# Patient Record
Sex: FEMALE | Race: WHITE | NOT HISPANIC OR LATINO | URBAN - METROPOLITAN AREA
[De-identification: names, ages, dates, MRNs, and addresses within clinical notes are randomized per-mention and may not be internally consistent; named-entity substitution may affect disease eponyms.]

---

## 2017-06-01 ENCOUNTER — EMERGENCY (EMERGENCY)
Facility: HOSPITAL | Age: 77
LOS: 1 days | Discharge: HOME | End: 2017-06-01
Attending: EMERGENCY MEDICINE
Payer: MEDICARE

## 2017-06-01 VITALS
WEIGHT: 209 LBS | DIASTOLIC BLOOD PRESSURE: 69 MMHG | RESPIRATION RATE: 20 BRPM | HEIGHT: 69 IN | SYSTOLIC BLOOD PRESSURE: 97 MMHG | HEART RATE: 88 BPM

## 2017-06-01 DIAGNOSIS — Z84.89 FAMILY HISTORY OF OTHER SPECIFIED CONDITIONS: Chronic | ICD-10-CM

## 2017-06-01 LAB
ALBUMIN SERPL ELPH-MCNC: 3.8 G/DL — SIGNIFICANT CHANGE UP (ref 3.3–5.2)
ALP SERPL-CCNC: 69 U/L — SIGNIFICANT CHANGE UP (ref 40–120)
ALT FLD-CCNC: 24 U/L — SIGNIFICANT CHANGE UP
ANION GAP SERPL CALC-SCNC: 16 MMOL/L — SIGNIFICANT CHANGE UP (ref 5–17)
AST SERPL-CCNC: 17 U/L — SIGNIFICANT CHANGE UP
BILIRUB SERPL-MCNC: 0.6 MG/DL — SIGNIFICANT CHANGE UP (ref 0.4–2)
BUN SERPL-MCNC: 16 MG/DL — SIGNIFICANT CHANGE UP (ref 8–20)
CALCIUM SERPL-MCNC: 9.5 MG/DL — SIGNIFICANT CHANGE UP (ref 8.6–10.2)
CHLORIDE SERPL-SCNC: 96 MMOL/L — LOW (ref 98–107)
CK SERPL-CCNC: 32 U/L — SIGNIFICANT CHANGE UP (ref 25–170)
CO2 SERPL-SCNC: 26 MMOL/L — SIGNIFICANT CHANGE UP (ref 22–29)
CREAT SERPL-MCNC: 0.76 MG/DL — SIGNIFICANT CHANGE UP (ref 0.5–1.3)
GLUCOSE SERPL-MCNC: 145 MG/DL — HIGH (ref 70–115)
HCT VFR BLD CALC: 42 % — SIGNIFICANT CHANGE UP (ref 37–47)
HGB BLD-MCNC: 14.6 G/DL — SIGNIFICANT CHANGE UP (ref 12–16)
MCHC RBC-ENTMCNC: 29.6 PG — SIGNIFICANT CHANGE UP (ref 27–31)
MCHC RBC-ENTMCNC: 34.8 G/DL — SIGNIFICANT CHANGE UP (ref 32–36)
MCV RBC AUTO: 85 FL — SIGNIFICANT CHANGE UP (ref 81–99)
PLATELET # BLD AUTO: 256 K/UL — SIGNIFICANT CHANGE UP (ref 150–400)
POTASSIUM SERPL-MCNC: 4.1 MMOL/L — SIGNIFICANT CHANGE UP (ref 3.5–5.3)
POTASSIUM SERPL-SCNC: 4.1 MMOL/L — SIGNIFICANT CHANGE UP (ref 3.5–5.3)
PROT SERPL-MCNC: 7.3 G/DL — SIGNIFICANT CHANGE UP (ref 6.6–8.7)
RBC # BLD: 4.94 M/UL — SIGNIFICANT CHANGE UP (ref 4.4–5.2)
RBC # FLD: 13.4 % — SIGNIFICANT CHANGE UP (ref 11–15.6)
SODIUM SERPL-SCNC: 138 MMOL/L — SIGNIFICANT CHANGE UP (ref 135–145)
TROPONIN T SERPL-MCNC: <0.01 NG/ML — SIGNIFICANT CHANGE UP (ref 0–0.06)
WBC # BLD: 9 K/UL — SIGNIFICANT CHANGE UP (ref 4.8–10.8)
WBC # FLD AUTO: 9 K/UL — SIGNIFICANT CHANGE UP (ref 4.8–10.8)

## 2017-06-01 PROCEDURE — 71010: CPT | Mod: 26

## 2017-06-01 PROCEDURE — 93010 ELECTROCARDIOGRAM REPORT: CPT

## 2017-06-01 PROCEDURE — 99285 EMERGENCY DEPT VISIT HI MDM: CPT

## 2017-06-01 RX ORDER — LEVOTHYROXINE SODIUM 125 MCG
1 TABLET ORAL
Qty: 0 | Refills: 0 | COMMUNITY

## 2017-06-01 RX ORDER — ASPIRIN/CALCIUM CARB/MAGNESIUM 324 MG
1 TABLET ORAL
Qty: 0 | Refills: 0 | COMMUNITY

## 2017-06-01 RX ORDER — SODIUM CHLORIDE 9 MG/ML
1000 INJECTION INTRAMUSCULAR; INTRAVENOUS; SUBCUTANEOUS ONCE
Qty: 0 | Refills: 0 | Status: COMPLETED | OUTPATIENT
Start: 2017-06-01 | End: 2017-06-01

## 2017-06-01 RX ORDER — LABETALOL HCL 100 MG
1 TABLET ORAL
Qty: 0 | Refills: 0 | COMMUNITY

## 2017-06-01 RX ORDER — BENAZEPRIL HYDROCHLORIDE 40 MG/1
1 TABLET ORAL
Qty: 0 | Refills: 0 | COMMUNITY

## 2017-06-01 RX ORDER — GABAPENTIN 400 MG/1
0 CAPSULE ORAL
Qty: 0 | Refills: 0 | COMMUNITY

## 2017-06-01 RX ORDER — SIMVASTATIN 20 MG/1
1 TABLET, FILM COATED ORAL
Qty: 0 | Refills: 0 | COMMUNITY

## 2017-06-01 RX ORDER — VERAPAMIL HCL 240 MG
1 CAPSULE, EXTENDED RELEASE PELLETS 24 HR ORAL
Qty: 0 | Refills: 0 | COMMUNITY

## 2017-06-01 RX ORDER — ACETAMINOPHEN 500 MG
0 TABLET ORAL
Qty: 0 | Refills: 0 | COMMUNITY

## 2017-06-01 RX ADMIN — SODIUM CHLORIDE 500 MILLILITER(S): 9 INJECTION INTRAMUSCULAR; INTRAVENOUS; SUBCUTANEOUS at 20:05

## 2017-06-01 NOTE — ED PROVIDER NOTE - NS ED MD SCRIBE ATTENDING SCRIBE SECTIONS
DISPOSITION/HISTORY OF PRESENT ILLNESS/REVIEW OF SYSTEMS/PAST MEDICAL/SURGICAL/SOCIAL HISTORY/VITAL SIGNS( Pullset)/PHYSICAL EXAM/RESULTS

## 2017-06-01 NOTE — ED PROVIDER NOTE - CARDIAC, MLM
Normal rate, regular rhythm.  Heart sounds S1, S2.  No murmurs, rubs or gallops. Radial, femoral, dorsalis pedis and posterior tibial pulses intact.

## 2017-06-01 NOTE — ED ADULT NURSE NOTE - OBJECTIVE STATEMENT
Pt received in [facility]L c/o abd pain and back pain. Pt with shingles on back that radiates around left side to abd. Pt was dx May 12th and the shingles appear to be scabbed over. Pt states she went to [place] and was admitted and given pain medication she cannot recall the name. Pt now states she had 1 BM in the last 5 days and feels constipated. Abd is soft, non distended and tender to palpation in all 4 quadrants. +bsx4. Pt also states she fainted on [place] [facility] Pt was helped to the floor by her  and son and denies hitting her head or anything. Pt deneis chest pain, SOB, fever/chills, N/V/D.

## 2017-06-01 NOTE — ED PROVIDER NOTE - PROGRESS NOTE DETAILS
pt with mild orthostatic changes will hydrate, pt has bp discrepancy in both arms concern for possible aortic involvement will consider ct scan of chest, cxr nml mediastinum

## 2017-06-01 NOTE — ED ADULT TRIAGE NOTE - CHIEF COMPLAINT QUOTE
pt states she was moving a matrass 3 weeks ago and thought she pulled a muscle in her back. pt was admitted to [facility] and given pain meds which caused her to hallucinate and become constipated. pt was subsequently diagnosed with shingles and taken off meds. . pt reportedly had diarrhea x 1 week.pt and fainted last thursday. pt has become increasingly weak and fainted again last Saturday 5/26/17. pt is hypotensive at triage

## 2017-06-01 NOTE — ED ADULT NURSE NOTE - CHIEF COMPLAINT QUOTE
pt states she was moving a matrass 3 weeks ago and thought she pulled a muscle in her back. pt was admitted to [facility] and given pain meds which caused her to hallucinate and become constipated. pt was subsequently diagnosed with shingles and taken off meds. . pt reportedly had diarrhea x 1 week.pt and fainted last thursday. pt has become increasingly weak and fainted again last Saturday 5/26/17. pt is hypotensive at triage

## 2017-06-01 NOTE — ED PROVIDER NOTE - OBJECTIVE STATEMENT
76 y/o F with hx of DM, HTN presents to the ED with multiple medical complaints today. As per sister at bedside, pt was moving her matress 3 weeks ago and suddenly felt back pain 5-6 hours later. Pt was evaluated for her back pain by her PMD who prescribed her pain medications, taken with no relief. Pt was then evaluated at [place] [facility] and prescribed Naproxen, Oxycodone and Diazepam over two visits, all taken with no relief in her back pain. Pt returned to [place] ED one more time and was found to have shingles to her back; pt [place] [facility] [place].     During her hospitalization, her shingles was treated with anti-viral medication but she states she no longer uses it after her discharge. She notes that she had been constipated from consuming her pain medications. Pt was given laxatives and stool softeners to treat her constipation. She does report receiving an enema for her constipation but states that it did not help her. Pt also notes that during her 1 week hospitalization, she began to experience hallucinations as a result from her pain medications as well.    After pt was discharged from [place], she states that she felt increased weakness and experienced several episodes of diarrhea; she still notes persisting shingles to her back and that it has began to spread to her front around her L side. Pt had multiple episodes of "passing out", prompting her to return to [place] ED where she had her BP stabilized and hydrated; pt was admitted for outpt observation 5/29/16. She notes her sx began to recede slightly but today, pt reports a significant decrease in her BP: 160/90 L arm and 144/72 R arm this morning, 75/54 L arm and 159/80 R arm after taking her HTN medications. Pt's primary concern for her ED visit today is her varying BP throughout the day as well as inconsistent BP between her L and R arms. Pt denies chest pain, HA, fever, chills. No further complaints at this time. PMD is Dr. Nathan. Pt has appointment with Cardiologist Dr. Botello at [facility] tomorrow. History primarily received from sister at bedside.

## 2017-06-02 VITALS
TEMPERATURE: 98 F | RESPIRATION RATE: 18 BRPM | SYSTOLIC BLOOD PRESSURE: 159 MMHG | OXYGEN SATURATION: 100 % | HEART RATE: 80 BPM | DIASTOLIC BLOOD PRESSURE: 82 MMHG

## 2017-06-02 DIAGNOSIS — I77.9 DISORDER OF ARTERIES AND ARTERIOLES, UNSPECIFIED: ICD-10-CM

## 2017-06-02 DIAGNOSIS — I10 ESSENTIAL (PRIMARY) HYPERTENSION: ICD-10-CM

## 2017-06-02 DIAGNOSIS — I77.1 STRICTURE OF ARTERY: ICD-10-CM

## 2017-06-02 PROCEDURE — 82550 ASSAY OF CK (CPK): CPT

## 2017-06-02 PROCEDURE — 85027 COMPLETE CBC AUTOMATED: CPT

## 2017-06-02 PROCEDURE — 71275 CT ANGIOGRAPHY CHEST: CPT | Mod: 26

## 2017-06-02 PROCEDURE — 71045 X-RAY EXAM CHEST 1 VIEW: CPT

## 2017-06-02 PROCEDURE — 93005 ELECTROCARDIOGRAM TRACING: CPT

## 2017-06-02 PROCEDURE — 71275 CT ANGIOGRAPHY CHEST: CPT

## 2017-06-02 PROCEDURE — 80053 COMPREHEN METABOLIC PANEL: CPT

## 2017-06-02 PROCEDURE — 74174 CTA ABD&PLVS W/CONTRAST: CPT

## 2017-06-02 PROCEDURE — 99284 EMERGENCY DEPT VISIT MOD MDM: CPT | Mod: 25

## 2017-06-02 PROCEDURE — 74174 CTA ABD&PLVS W/CONTRAST: CPT | Mod: 26

## 2017-06-02 PROCEDURE — 84484 ASSAY OF TROPONIN QUANT: CPT

## 2017-06-02 NOTE — CONSULT NOTE ADULT - ASSESSMENT
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

## 2017-06-02 NOTE — CONSULT NOTE ADULT - PROBLEM SELECTOR RECOMMENDATION 3
f/u with Dr. Murcia as outpatient   Recommend arterial and venous duplex of b/l lower extremities f/u with Dr. Murcia as outpatient   Recommend arterial and venous duplex of b/l lower extremities  explained that this is not emergent procedure and that pt should follow up with Vascular surgeon, even though she does not want surgery

## 2017-06-02 NOTE — CONSULT NOTE ADULT - PROBLEM SELECTOR RECOMMENDATION 9
No acute vascular surgery intervention at this time  Control of Blood pressure  f/u cardiologist, she has scheduled appointment today

## 2017-06-02 NOTE — CONSULT NOTE ADULT - SUBJECTIVE AND OBJECTIVE BOX
HPI: 76 y/o F w/ PMH HTN, DM comes to the ED w/ c/o back pain and findings from [facility] of difference in BP between  her right and left upper extremity. She is recovering from shingles in which she was hospitalized for a week in [place]. She was given pain medications which caused some constipation and was given laxatives for it. Now she c/o multiple episodes of diarrhea and weakness. She denies fevers, chills, nausea, vomiting chest pain, palpitations, or dizziness.    Allergies: Diazepam, oxycodone (dizziness)  PMH: HTN, DM  PSH: Cholecystectomy 2009 in [place]  SH: Denies smoking history     Medications:  1. Labetalol 100 mg  Daily  2. Verapamil 240 mg daily  3. Benzapril 20 mg Daily  4. Simvastatin 5 mg at Bedtime  5. Janumet XR 1000mg-50mg 1 in AM and 1/2 at night  6. ASA 81 mg Daily     Physical exam:  Vital signs noted, Right arm(Sitting) : BP- 176/90; Left arm  (Sitting)-159/82; HR- 82, Afebrile  General: NAD  Chest: Normal S1 and S2 sounds  Abd: Soft, nondistended, nontender  Ext: Palpable pulses in upper extremities bilaterally, no edema    Labs:    CBC- no leukocytosis or neutrophil shift  BMP- Electrolytes wnl    Imaging:  CT Angio: No aortic dissection or aneurysm. Notable for marked narrowing/stenosis of origin of left subclavian artery. Occlusion of left proximal SFA w/ distal reconstitution. HPI: 76 y/o F w/ PMH HTN, DM comes to the ED w/ c/o back pain and findings from [facility] of difference in BP between  her right and left upper extremity. She is recovering from shingles in which she was hospitalized for a week in [place]. She was given pain medications which caused some constipation and was given laxatives for it. Now she c/o multiple episodes of diarrhea and weakness. She denies fevers, chills, nausea, vomiting chest pain, palpitations, or dizziness.    Allergies: Diazepam, oxycodone (dizziness)  PMH: HTN, DM  PSH: Cholecystectomy 2009 in [place]  SH: Denies smoking history     Medications:  1. Labetalol 100 mg  Daily  2. Verapamil 240 mg daily  3. Benzapril 20 mg Daily  4. Simvastatin 5 mg at Bedtime  5. Janumet XR 1000mg-50mg 1 in AM and 1/2 at night  6. ASA 81 mg Daily     Physical exam:  Vital signs noted, Right arm(Sitting) : BP- 176/90; Left arm  (Sitting)-159/82; HR- 82, Afebrile  General: NAD  Chest: Normal S1 and S2 sounds  Abd: Soft, nondistended, nontender  Ext: Palpable 2+ pulses in upper extremities bilaterally - strong radial pulses, no edema, RLE varicose veins, no tissue loss bilaterally, motor and sensory intact    Labs:    CBC- no leukocytosis or neutrophil shift  BMP- Electrolytes wnl    Imaging:  CT Angio: No aortic dissection or aneurysm. Notable for marked narrowing/stenosis of origin of left subclavian artery. Occlusion of left proximal SFA w/ distal reconstitution.

## 2018-03-19 PROBLEM — M19.90 UNSPECIFIED OSTEOARTHRITIS, UNSPECIFIED SITE: Chronic | Status: ACTIVE | Noted: 2017-06-01

## 2018-03-19 PROBLEM — E78.5 HYPERLIPIDEMIA, UNSPECIFIED: Chronic | Status: ACTIVE | Noted: 2017-06-01

## 2018-03-19 PROBLEM — I10 ESSENTIAL (PRIMARY) HYPERTENSION: Chronic | Status: ACTIVE | Noted: 2017-06-01

## 2018-03-19 PROBLEM — E11.9 TYPE 2 DIABETES MELLITUS WITHOUT COMPLICATIONS: Chronic | Status: ACTIVE | Noted: 2017-06-02

## 2018-03-22 PROBLEM — Z00.00 ENCOUNTER FOR PREVENTIVE HEALTH EXAMINATION: Status: ACTIVE | Noted: 2018-03-22

## 2018-03-29 ENCOUNTER — APPOINTMENT (OUTPATIENT)
Dept: OPHTHALMOLOGY | Facility: CLINIC | Age: 78
End: 2018-03-29
Payer: MEDICARE

## 2018-03-29 DIAGNOSIS — H40.033 ANATOMICAL NARROW ANGLE, BILATERAL: ICD-10-CM

## 2018-03-29 DIAGNOSIS — H25.13 AGE-RELATED NUCLEAR CATARACT, BILATERAL: ICD-10-CM

## 2018-03-29 PROCEDURE — 92132 CPTRZD OPH DX IMG ANT SGM: CPT

## 2018-03-29 PROCEDURE — 92002 INTRM OPH EXAM NEW PATIENT: CPT

## 2021-02-05 ENCOUNTER — TRANSCRIPTION ENCOUNTER (OUTPATIENT)
Age: 81
End: 2021-02-05

## 2021-12-02 NOTE — ED ADULT NURSE NOTE - PATIENT DISCHARGE SIGNATURE
Impression: Dry eye syndrome of bilateral lacrimal glands: H04.123. Plan: Dry eyes account for the patient's complaints. There is no evidence of permanent changes to the cornea. Explained condition does not have a cure and will need artificial tears for maintenance. 02-Jun-2017

## 2023-07-26 ENCOUNTER — OFFICE (OUTPATIENT)
Dept: URBAN - METROPOLITAN AREA CLINIC 105 | Facility: CLINIC | Age: 83
Setting detail: OPHTHALMOLOGY
End: 2023-07-26
Payer: MEDICARE

## 2023-07-26 DIAGNOSIS — H40.2232: ICD-10-CM

## 2023-07-26 DIAGNOSIS — H40.033: ICD-10-CM

## 2023-07-26 DIAGNOSIS — E11.9: ICD-10-CM

## 2023-07-26 DIAGNOSIS — H35.363: ICD-10-CM

## 2023-07-26 DIAGNOSIS — H25.13: ICD-10-CM

## 2023-07-26 DIAGNOSIS — H35.033: ICD-10-CM

## 2023-07-26 DIAGNOSIS — C34.00: ICD-10-CM

## 2023-07-26 DIAGNOSIS — H47.393: ICD-10-CM

## 2023-07-26 PROCEDURE — 99214 OFFICE O/P EST MOD 30 MIN: CPT | Performed by: STUDENT IN AN ORGANIZED HEALTH CARE EDUCATION/TRAINING PROGRAM

## 2023-07-26 PROCEDURE — 92250 FUNDUS PHOTOGRAPHY W/I&R: CPT | Performed by: STUDENT IN AN ORGANIZED HEALTH CARE EDUCATION/TRAINING PROGRAM

## 2023-07-26 ASSESSMENT — KERATOMETRY
OD_K2POWER_DIOPTERS: 42.25
OS_K1POWER_DIOPTERS: 41.25
OS_K2POWER_DIOPTERS: 42.00
OS_AXISANGLE_DEGREES: 018
OD_AXISANGLE_DEGREES: 167
OD_K1POWER_DIOPTERS: 41.00

## 2023-07-26 ASSESSMENT — CONFRONTATIONAL VISUAL FIELD TEST (CVF)
OS_FINDINGS: FULL
OD_FINDINGS: FULL

## 2023-07-26 ASSESSMENT — SPHEQUIV_DERIVED
OD_SPHEQUIV: 3.25
OS_SPHEQUIV: 3.75
OS_SPHEQUIV: 3.375
OD_SPHEQUIV: 4
OD_SPHEQUIV: 3.25
OS_SPHEQUIV: 3.375

## 2023-07-26 ASSESSMENT — REFRACTION_CURRENTRX
OS_AXIS: 094
OS_CYLINDER: -1.00
OD_AXIS: 084
OD_SPHERE: +3.75
OD_OVR_VA: 20/
OS_ADD: +0.00
OS_AXIS: 180
OD_CYLINDER: 0.00
OD_VPRISM_DIRECTION: SV
OD_VPRISM_DIRECTION: SV
OS_VPRISM_DIRECTION: SV
OD_AXIS: 071
OD_SPHERE: +5.75
OD_ADD: +0.00
OS_OVR_VA: 20/
OS_ADD: +0.00
OD_SPHERE: +4.25
OS_SPHERE: +4.00
OS_CYLINDER: 0.00
OS_SPHERE: +4.75
OS_CYLINDER: -0.50
OD_CYLINDER: -1.00
OD_ADD: +0.00
OS_OVR_VA: 20/
OS_OVR_VA: 20/
OD_OVR_VA: 20/
OS_AXIS: 090
OD_CYLINDER: -0.50
OD_AXIS: 180
OS_VPRISM_DIRECTION: SV
OD_OVR_VA: 20/
OS_SPHERE: +5.50

## 2023-07-26 ASSESSMENT — REFRACTION_MANIFEST
OD_SPHERE: +4.50
OD_VA1: 20/40-
OD_AXIS: 091
OD_CYLINDER: -3.00
OD_VA1: 20/NI
OS_AXIS: 030
OD_CYLINDER: -1.00
OS_AXIS: 081
OS_VA1: 20/NI
OS_VA1: 20/30
OS_SPHERE: +4.00
OS_CYLINDER: -0.50
OS_SPHERE: +4.00
OD_AXIS: 080
OD_SPHERE: +4.75
OS_CYLINDER: -1.25

## 2023-07-26 ASSESSMENT — PACHYMETRY
OD_CT_UM: 575
OD_CT_CORRECTION: -2
OS_CT_UM: 586
OS_CT_CORRECTION: -3

## 2023-07-26 ASSESSMENT — AXIALLENGTH_DERIVED
OD_AL: 23.0282
OS_AL: 22.9819
OD_AL: 23.0282
OS_AL: 22.9819
OD_AL: 22.7531
OS_AL: 22.8441

## 2023-07-26 ASSESSMENT — VISUAL ACUITY
OS_BCVA: 20/40-2
OD_BCVA: 20/40

## 2023-07-26 ASSESSMENT — REFRACTION_AUTOREFRACTION
OS_CYLINDER: -1.25
OS_SPHERE: +4.00
OD_SPHERE: +4.75
OS_AXIS: 081
OD_AXIS: 091
OD_CYLINDER: -3.00

## 2023-07-26 ASSESSMENT — TONOMETRY
OD_IOP_MMHG: 16
OS_IOP_MMHG: 16

## 2023-08-22 ENCOUNTER — OFFICE (OUTPATIENT)
Dept: URBAN - METROPOLITAN AREA CLINIC 113 | Facility: CLINIC | Age: 83
Setting detail: OPHTHALMOLOGY
End: 2023-08-22
Payer: MEDICARE

## 2023-08-22 DIAGNOSIS — H25.11: ICD-10-CM

## 2023-08-22 DIAGNOSIS — H25.13: ICD-10-CM

## 2023-08-22 PROCEDURE — 92136 OPHTHALMIC BIOMETRY: CPT | Performed by: STUDENT IN AN ORGANIZED HEALTH CARE EDUCATION/TRAINING PROGRAM

## 2023-08-22 PROCEDURE — 99213 OFFICE O/P EST LOW 20 MIN: CPT | Performed by: STUDENT IN AN ORGANIZED HEALTH CARE EDUCATION/TRAINING PROGRAM

## 2023-08-22 ASSESSMENT — SPHEQUIV_DERIVED
OS_SPHEQUIV: 3.375
OS_SPHEQUIV: 3.75
OD_SPHEQUIV: 4
OD_SPHEQUIV: 3.25
OS_SPHEQUIV: 3.5
OD_SPHEQUIV: 24.75

## 2023-08-22 ASSESSMENT — REFRACTION_MANIFEST
OD_CYLINDER: -1.00
OD_VA1: 20/40-
OS_CYLINDER: -1.25
OD_AXIS: 091
OD_SPHERE: +4.75
OD_CYLINDER: -3.00
OS_SPHERE: +4.00
OS_CYLINDER: -0.50
OS_SPHERE: +4.00
OS_VA1: 20/NI
OD_AXIS: 080
OS_VA1: 20/30
OS_AXIS: 081
OS_AXIS: 030
OD_SPHERE: +4.50
OD_VA1: 20/NI

## 2023-08-22 ASSESSMENT — KERATOMETRY
OS_K1POWER_DIOPTERS: 41.75
OD_K1K2_AVERAGE: 41.875
OS_CYLPOWER_DEGREES: 0.5
OD_CYLPOWER_DEGREES: 0.25
OD_K2POWER_DIOPTERS: 42.00
OD_AXISANGLE_DEGREES: 143
OS_CYLAXISANGLE_DEGREES: 128
OD_AXISANGLE_DEGREES: 53
OD_AXISANGLE2_DEGREES: 143
OS_K2POWER_DIOPTERS: 42.25
OD_K2POWER_DIOPTERS: 42.00
OS_K2POWER_DIOPTERS: 42.25
OS_AXISANGLE_DEGREES: 128
OS_AXISANGLE2_DEGREES: 128
OS_K1POWER_DIOPTERS: 41.75
OD_K1POWER_DIOPTERS: 41.75
OD_K1POWER_DIOPTERS: 41.75
OD_CYLAXISANGLE_DEGREES: 143
OS_K1K2_AVERAGE: 42
OS_AXISANGLE_DEGREES: 38

## 2023-08-22 ASSESSMENT — VISUAL ACUITY
OD_BCVA: 20/40-2
OS_BCVA: 20/40-2

## 2023-08-22 ASSESSMENT — REFRACTION_CURRENTRX
OS_VPRISM_DIRECTION: SV
OS_CYLINDER: -0.50
OD_AXIS: 084
OD_CYLINDER: -0.50
OD_SPHERE: +4.25
OD_AXIS: 180
OD_CYLINDER: 0.00
OD_ADD: +0.00
OD_VPRISM_DIRECTION: SV
OS_AXIS: 094
OS_VPRISM_DIRECTION: SV
OD_OVR_VA: 20/
OS_CYLINDER: 0.00
OS_AXIS: 090
OS_ADD: +0.00
OS_ADD: +0.00
OS_SPHERE: +4.00
OD_SPHERE: +3.75
OD_SPHERE: +5.75
OS_OVR_VA: 20/
OS_SPHERE: +5.50
OS_SPHERE: +4.75
OS_AXIS: 180
OD_AXIS: 071
OD_ADD: +0.00
OD_OVR_VA: 20/
OD_OVR_VA: 20/
OS_OVR_VA: 20/
OD_CYLINDER: -1.00
OS_CYLINDER: -1.00
OD_VPRISM_DIRECTION: SV
OS_OVR_VA: 20/

## 2023-08-22 ASSESSMENT — REFRACTION_AUTOREFRACTION
OD_SPHERE: +24.75
OD_AXIS: 000
OS_AXIS: 069
OS_CYLINDER: -1.00
OD_CYLINDER: 0.00
OS_SPHERE: +4.00

## 2023-08-22 ASSESSMENT — PACHYMETRY
OD_CT_CORRECTION: -2
OS_CT_CORRECTION: -3
OS_CT_UM: 586
OD_CT_UM: 575

## 2023-08-22 ASSESSMENT — AXIALLENGTH_DERIVED
OD_AL: 22.668
OD_AL: 22.941
OS_AL: 22.7157
OS_AL: 22.8519
OS_AL: 22.8063
OD_AL: 17.05

## 2023-08-22 ASSESSMENT — CONFRONTATIONAL VISUAL FIELD TEST (CVF)
OS_FINDINGS: FULL
OD_FINDINGS: FULL

## 2023-09-19 ENCOUNTER — AMBULATORY SURGERY CENTER (OUTPATIENT)
Dept: URBAN - METROPOLITAN AREA SURGERY 4 | Facility: SURGERY | Age: 83
Setting detail: OPHTHALMOLOGY
End: 2023-09-19
Payer: MEDICARE

## 2023-09-19 DIAGNOSIS — H57.03: ICD-10-CM

## 2023-09-19 DIAGNOSIS — H25.11: ICD-10-CM

## 2023-09-19 PROCEDURE — 66982 XCAPSL CTRC RMVL CPLX WO ECP: CPT | Performed by: STUDENT IN AN ORGANIZED HEALTH CARE EDUCATION/TRAINING PROGRAM

## 2023-09-20 ENCOUNTER — OFFICE (OUTPATIENT)
Dept: URBAN - METROPOLITAN AREA CLINIC 113 | Facility: CLINIC | Age: 83
Setting detail: OPHTHALMOLOGY
End: 2023-09-20
Payer: MEDICARE

## 2023-09-20 DIAGNOSIS — Z96.1: ICD-10-CM

## 2023-09-20 DIAGNOSIS — H11.31: ICD-10-CM

## 2023-09-20 PROCEDURE — 99024 POSTOP FOLLOW-UP VISIT: CPT | Performed by: STUDENT IN AN ORGANIZED HEALTH CARE EDUCATION/TRAINING PROGRAM

## 2023-09-20 ASSESSMENT — REFRACTION_CURRENTRX
OD_OVR_VA: 20/
OS_AXIS: 180
OS_CYLINDER: -0.50
OD_VPRISM_DIRECTION: SV
OD_SPHERE: +4.25
OD_CYLINDER: -1.00
OD_CYLINDER: 0.00
OD_CYLINDER: -0.50
OS_SPHERE: +4.75
OS_VPRISM_DIRECTION: SV
OD_AXIS: 180
OD_OVR_VA: 20/
OS_AXIS: 094
OD_SPHERE: +5.75
OS_CYLINDER: 0.00
OD_ADD: +0.00
OD_AXIS: 071
OS_CYLINDER: -1.00
OS_ADD: +0.00
OD_SPHERE: +3.75
OS_VPRISM_DIRECTION: SV
OS_OVR_VA: 20/
OS_AXIS: 090
OD_AXIS: 084
OS_OVR_VA: 20/
OD_OVR_VA: 20/
OD_ADD: +0.00
OS_SPHERE: +5.50
OD_VPRISM_DIRECTION: SV
OS_SPHERE: +4.00
OS_ADD: +0.00
OS_OVR_VA: 20/

## 2023-09-20 ASSESSMENT — REFRACTION_MANIFEST
OS_VA1: 20/NI
OS_SPHERE: +4.00
OD_VA1: 20/40-
OD_SPHERE: +4.75
OD_CYLINDER: -3.00
OS_CYLINDER: -0.50
OD_AXIS: 080
OS_CYLINDER: -1.25
OS_SPHERE: +4.00
OD_AXIS: 091
OD_SPHERE: +4.50
OD_CYLINDER: -1.00
OS_AXIS: 081
OS_AXIS: 030
OD_VA1: 20/NI
OS_VA1: 20/30

## 2023-09-20 ASSESSMENT — CORNEAL EDEMA - FOLDS/STRIAE: OD_FOLDSSTRIAE: 3+

## 2023-09-20 ASSESSMENT — KERATOMETRY
OD_K2POWER_DIOPTERS: 41.75
OD_AXISANGLE_DEGREES: 155
OS_K2POWER_DIOPTERS: 42.00
OS_K1POWER_DIOPTERS: 42.00
OD_K1POWER_DIOPTERS: 41.25
OS_AXISANGLE_DEGREES: 090

## 2023-09-20 ASSESSMENT — AXIALLENGTH_DERIVED
OD_AL: 24.4543
OD_AL: 22.7959
OS_AL: 22.8519
OD_AL: 23.072
OS_AL: 22.7157

## 2023-09-20 ASSESSMENT — SPHEQUIV_DERIVED
OS_SPHEQUIV: 3.375
OS_SPHEQUIV: 3.75
OD_SPHEQUIV: 3.25
OD_SPHEQUIV: -0.25
OD_SPHEQUIV: 4

## 2023-09-20 ASSESSMENT — CORNEAL EDEMA CLINICAL DESCRIPTION: OD_CORNEALEDEMA: 2+

## 2023-09-20 ASSESSMENT — REFRACTION_AUTOREFRACTION
OS_CYLINDER: SPHERE
OD_CYLINDER: -1.50
OS_SPHERE: +24.75
OD_AXIS: 091
OD_SPHERE: +0.50
OS_AXIS: 069

## 2023-09-20 ASSESSMENT — CONFRONTATIONAL VISUAL FIELD TEST (CVF)
OS_FINDINGS: FULL
OD_FINDINGS: FULL

## 2023-09-20 ASSESSMENT — VISUAL ACUITY
OS_BCVA: 20/60-1
OD_BCVA: 20/40-2

## 2023-09-20 ASSESSMENT — PACHYMETRY
OS_CT_CORRECTION: -3
OD_CT_UM: 575
OS_CT_UM: 586
OD_CT_CORRECTION: -2

## 2023-09-20 ASSESSMENT — TONOMETRY: OD_IOP_MMHG: 14

## 2023-09-27 ENCOUNTER — RX ONLY (RX ONLY)
Age: 83
End: 2023-09-27

## 2023-09-27 ENCOUNTER — OFFICE (OUTPATIENT)
Dept: URBAN - METROPOLITAN AREA CLINIC 105 | Facility: CLINIC | Age: 83
Setting detail: OPHTHALMOLOGY
End: 2023-09-27
Payer: MEDICARE

## 2023-09-27 DIAGNOSIS — H25.12: ICD-10-CM

## 2023-09-27 DIAGNOSIS — Z96.1: ICD-10-CM

## 2023-09-27 PROBLEM — H11.31 SUB-CONJUNCTIVAL HEMORRHAGE; RIGHT EYE: Status: RESOLVED | Noted: 2023-09-20 | Resolved: 2023-09-27

## 2023-09-27 PROCEDURE — 99024 POSTOP FOLLOW-UP VISIT: CPT | Performed by: STUDENT IN AN ORGANIZED HEALTH CARE EDUCATION/TRAINING PROGRAM

## 2023-09-27 PROCEDURE — 92136 OPHTHALMIC BIOMETRY: CPT | Performed by: STUDENT IN AN ORGANIZED HEALTH CARE EDUCATION/TRAINING PROGRAM

## 2023-09-27 ASSESSMENT — PACHYMETRY
OS_CT_CORRECTION: -3
OD_CT_CORRECTION: -2
OD_CT_UM: 575
OS_CT_UM: 586

## 2023-09-27 ASSESSMENT — REFRACTION_MANIFEST
OD_CYLINDER: -3.00
OS_SPHERE: +4.00
OS_SPHERE: +4.00
OD_SPHERE: +4.50
OS_VA1: 20/NI
OD_SPHERE: +4.75
OS_AXIS: 030
OS_CYLINDER: -1.25
OD_VA1: 20/NI
OD_AXIS: 080
OS_VA1: 20/30
OS_AXIS: 081
OD_CYLINDER: -1.00
OS_CYLINDER: -0.50
OD_AXIS: 091
OD_VA1: 20/40-

## 2023-09-27 ASSESSMENT — REFRACTION_CURRENTRX
OS_CYLINDER: -0.50
OS_ADD: +0.00
OS_OVR_VA: 20/
OD_OVR_VA: 20/
OD_ADD: +0.00
OS_SPHERE: +5.50
OD_AXIS: 180
OD_CYLINDER: -1.00
OD_SPHERE: +3.75
OD_CYLINDER: 0.00
OD_AXIS: 084
OD_CYLINDER: -0.50
OS_ADD: +0.00
OS_VPRISM_DIRECTION: SV
OS_CYLINDER: -1.00
OD_SPHERE: +5.75
OD_VPRISM_DIRECTION: SV
OD_ADD: +0.00
OS_OVR_VA: 20/
OS_SPHERE: +4.00
OS_AXIS: 090
OD_SPHERE: +4.25
OS_OVR_VA: 20/
OS_AXIS: 094
OD_VPRISM_DIRECTION: SV
OS_CYLINDER: 0.00
OD_AXIS: 071
OS_AXIS: 180
OS_SPHERE: +4.75
OS_VPRISM_DIRECTION: SV

## 2023-09-27 ASSESSMENT — SPHEQUIV_DERIVED
OS_SPHEQUIV: 3.375
OD_SPHEQUIV: 4
OS_SPHEQUIV: 3.75
OS_SPHEQUIV: 3.375
OD_SPHEQUIV: 3.25
OD_SPHEQUIV: -0.375

## 2023-09-27 ASSESSMENT — AXIALLENGTH_DERIVED
OD_AL: 24.5564
OD_AL: 22.8388
OS_AL: 22.9819
OS_AL: 22.8441
OS_AL: 22.9819
OD_AL: 23.116

## 2023-09-27 ASSESSMENT — VISUAL ACUITY
OS_BCVA: 20/40-
OD_BCVA: 20/40-

## 2023-09-27 ASSESSMENT — KERATOMETRY
OS_K1POWER_DIOPTERS: 41.25
OD_K1POWER_DIOPTERS: 41.00
OS_K2POWER_DIOPTERS: 42.00
OS_AXISANGLE_DEGREES: 008
OD_AXISANGLE_DEGREES: 174
OD_K2POWER_DIOPTERS: 41.75

## 2023-09-27 ASSESSMENT — CONFRONTATIONAL VISUAL FIELD TEST (CVF)
OS_FINDINGS: FULL
OD_FINDINGS: FULL

## 2023-09-27 ASSESSMENT — TONOMETRY: OD_IOP_MMHG: 12

## 2023-09-27 ASSESSMENT — CORNEAL EDEMA CLINICAL DESCRIPTION: OD_CORNEALEDEMA: T 1+

## 2023-09-27 ASSESSMENT — REFRACTION_AUTOREFRACTION
OS_AXIS: 088
OD_SPHERE: +0.25
OS_SPHERE: +4.00
OD_AXIS: 091
OS_CYLINDER: -1.25
OD_CYLINDER: -1.25

## 2023-09-27 ASSESSMENT — CORNEAL EDEMA - FOLDS/STRIAE: OD_FOLDSSTRIAE: T 1+

## 2023-10-03 ENCOUNTER — AMBULATORY SURGERY CENTER (OUTPATIENT)
Dept: URBAN - METROPOLITAN AREA SURGERY 4 | Facility: SURGERY | Age: 83
Setting detail: OPHTHALMOLOGY
End: 2023-10-03
Payer: MEDICARE

## 2023-10-03 DIAGNOSIS — H25.12: ICD-10-CM

## 2023-10-03 PROCEDURE — 66984 XCAPSL CTRC RMVL W/O ECP: CPT | Mod: 79,LT | Performed by: STUDENT IN AN ORGANIZED HEALTH CARE EDUCATION/TRAINING PROGRAM

## 2023-10-04 ENCOUNTER — OFFICE (OUTPATIENT)
Dept: URBAN - METROPOLITAN AREA CLINIC 105 | Facility: CLINIC | Age: 83
Setting detail: OPHTHALMOLOGY
End: 2023-10-04
Payer: MEDICARE

## 2023-10-04 ENCOUNTER — RX ONLY (RX ONLY)
Age: 83
End: 2023-10-04

## 2023-10-04 DIAGNOSIS — Z96.1: ICD-10-CM

## 2023-10-04 PROBLEM — H25.12 CATARACT SENILE NUCLEAR SCLEROSIS;  , LEFT EYE: Status: RESOLVED | Noted: 2023-09-20 | Resolved: 2023-10-04

## 2023-10-04 PROCEDURE — 99024 POSTOP FOLLOW-UP VISIT: CPT | Performed by: STUDENT IN AN ORGANIZED HEALTH CARE EDUCATION/TRAINING PROGRAM

## 2023-10-04 ASSESSMENT — SPHEQUIV_DERIVED
OD_SPHEQUIV: 4
OS_SPHEQUIV: -0.375
OS_SPHEQUIV: 3.375
OS_SPHEQUIV: 3.75
OD_SPHEQUIV: 3.25
OD_SPHEQUIV: -0.25

## 2023-10-04 ASSESSMENT — REFRACTION_CURRENTRX
OD_CYLINDER: -0.50
OS_SPHERE: +5.50
OS_OVR_VA: 20/
OS_AXIS: 090
OD_ADD: +0.00
OD_SPHERE: +4.25
OD_VPRISM_DIRECTION: SV
OS_AXIS: 094
OS_SPHERE: +4.75
OS_OVR_VA: 20/
OS_ADD: +0.00
OS_CYLINDER: -0.50
OS_CYLINDER: -1.00
OS_VPRISM_DIRECTION: SV
OD_VPRISM_DIRECTION: SV
OD_AXIS: 071
OD_OVR_VA: 20/
OD_SPHERE: +3.75
OS_SPHERE: +4.00
OD_OVR_VA: 20/
OS_OVR_VA: 20/
OD_OVR_VA: 20/
OS_CYLINDER: 0.00
OD_CYLINDER: 0.00
OD_AXIS: 084
OS_ADD: +0.00
OD_ADD: +0.00
OD_SPHERE: +5.75
OS_VPRISM_DIRECTION: SV
OS_AXIS: 180
OD_AXIS: 180
OD_CYLINDER: -1.00

## 2023-10-04 ASSESSMENT — REFRACTION_MANIFEST
OS_CYLINDER: -1.25
OD_CYLINDER: -1.00
OS_AXIS: 030
OD_VA1: 20/NI
OD_CYLINDER: -3.00
OS_CYLINDER: -0.50
OS_AXIS: 081
OD_SPHERE: +4.75
OS_VA1: 20/NI
OS_SPHERE: +4.00
OD_SPHERE: +4.50
OD_AXIS: 080
OS_SPHERE: +4.00
OD_AXIS: 091
OS_VA1: 20/30
OD_VA1: 20/40-

## 2023-10-04 ASSESSMENT — CORNEAL EDEMA - FOLDS/STRIAE: OD_FOLDSSTRIAE: T 1+

## 2023-10-04 ASSESSMENT — PACHYMETRY
OD_CT_CORRECTION: -2
OS_CT_UM: 586
OS_CT_CORRECTION: -3
OD_CT_UM: 575

## 2023-10-04 ASSESSMENT — AXIALLENGTH_DERIVED
OS_AL: 22.7157
OS_AL: 24.3102
OD_AL: 22.7959
OS_AL: 22.8519
OD_AL: 23.072
OD_AL: 24.4543

## 2023-10-04 ASSESSMENT — KERATOMETRY
OD_K1POWER_DIOPTERS: 41.00
OS_K1POWER_DIOPTERS: 42.00
OD_K2POWER_DIOPTERS: 42.00
OS_AXISANGLE_DEGREES: 090
OD_AXISANGLE_DEGREES: 175
OS_K2POWER_DIOPTERS: 42.00

## 2023-10-04 ASSESSMENT — REFRACTION_AUTOREFRACTION
OD_AXIS: 086
OS_AXIS: 158
OD_CYLINDER: -1.00
OD_SPHERE: +0.25
OS_SPHERE: -0.25
OS_CYLINDER: -0.25

## 2023-10-04 ASSESSMENT — VISUAL ACUITY
OS_BCVA: 20/30+2
OD_BCVA: 20/30-

## 2023-10-04 ASSESSMENT — TONOMETRY: OS_IOP_MMHG: 10

## 2023-10-04 ASSESSMENT — CONFRONTATIONAL VISUAL FIELD TEST (CVF)
OD_FINDINGS: FULL
OS_FINDINGS: FULL

## 2023-10-04 ASSESSMENT — CORNEAL EDEMA CLINICAL DESCRIPTION: OD_CORNEALEDEMA: T 1+

## 2023-10-10 ENCOUNTER — OFFICE (OUTPATIENT)
Dept: URBAN - METROPOLITAN AREA CLINIC 113 | Facility: CLINIC | Age: 83
Setting detail: OPHTHALMOLOGY
End: 2023-10-10
Payer: MEDICARE

## 2023-10-10 DIAGNOSIS — Z96.1: ICD-10-CM

## 2023-10-10 PROCEDURE — 99024 POSTOP FOLLOW-UP VISIT: CPT | Performed by: STUDENT IN AN ORGANIZED HEALTH CARE EDUCATION/TRAINING PROGRAM

## 2023-10-10 ASSESSMENT — REFRACTION_CURRENTRX
OD_AXIS: 084
OS_SPHERE: +4.00
OS_AXIS: 180
OS_AXIS: 090
OS_ADD: +0.00
OS_AXIS: 094
OD_ADD: +0.00
OS_ADD: +0.00
OD_VPRISM_DIRECTION: SV
OD_AXIS: 180
OS_VPRISM_DIRECTION: SV
OD_AXIS: 071
OD_CYLINDER: -0.50
OS_CYLINDER: -1.00
OS_OVR_VA: 20/
OS_VPRISM_DIRECTION: SV
OS_CYLINDER: 0.00
OD_SPHERE: +3.75
OD_CYLINDER: 0.00
OS_SPHERE: +4.75
OD_VPRISM_DIRECTION: SV
OD_ADD: +0.00
OD_SPHERE: +5.75
OD_OVR_VA: 20/
OS_OVR_VA: 20/
OD_CYLINDER: -1.00
OD_OVR_VA: 20/
OD_OVR_VA: 20/
OS_SPHERE: +5.50
OS_OVR_VA: 20/
OS_CYLINDER: -0.50
OD_SPHERE: +4.25

## 2023-10-10 ASSESSMENT — SPHEQUIV_DERIVED
OD_SPHEQUIV: -0.375
OS_SPHEQUIV: 3.75
OD_SPHEQUIV: 3.25
OS_SPHEQUIV: 3.375
OD_SPHEQUIV: 4
OS_SPHEQUIV: -0.5

## 2023-10-10 ASSESSMENT — CORNEAL EDEMA - FOLDS/STRIAE: OD_FOLDSSTRIAE: ABSENT

## 2023-10-10 ASSESSMENT — PACHYMETRY
OD_CT_CORRECTION: -2
OS_CT_CORRECTION: -3
OD_CT_UM: 575
OS_CT_UM: 586

## 2023-10-10 ASSESSMENT — REFRACTION_MANIFEST
OS_CYLINDER: -1.25
OD_VA1: 20/NI
OS_AXIS: 081
OD_SPHERE: +4.50
OD_CYLINDER: -1.00
OS_CYLINDER: -0.50
OS_VA1: 20/30
OD_AXIS: 091
OD_VA1: 20/40-
OS_SPHERE: +4.00
OS_AXIS: 030
OS_VA1: 20/NI
OD_CYLINDER: -3.00
OD_SPHERE: +4.75
OS_SPHERE: +4.00
OD_AXIS: 080

## 2023-10-10 ASSESSMENT — REFRACTION_AUTOREFRACTION
OS_CYLINDER: -0.50
OS_SPHERE: -0.25
OD_SPHERE: +0.25
OD_CYLINDER: -1.25
OS_AXIS: 094
OD_AXIS: 094

## 2023-10-10 ASSESSMENT — AXIALLENGTH_DERIVED
OD_AL: 22.7959
OD_AL: 23.072
OD_AL: 24.5068
OS_AL: 22.8872
OS_AL: 23.0255
OS_AL: 24.5594

## 2023-10-10 ASSESSMENT — CONFRONTATIONAL VISUAL FIELD TEST (CVF)
OS_FINDINGS: FULL
OD_FINDINGS: FULL

## 2023-10-10 ASSESSMENT — KERATOMETRY
OD_K1POWER_DIOPTERS: 41.00
OD_K2POWER_DIOPTERS: 42.00
OS_AXISANGLE_DEGREES: 006
OS_K2POWER_DIOPTERS: 41.75
OD_AXISANGLE_DEGREES: 175
OS_K1POWER_DIOPTERS: 41.25

## 2023-10-10 ASSESSMENT — CORNEAL EDEMA CLINICAL DESCRIPTION: OD_CORNEALEDEMA: ABSENT

## 2023-10-10 ASSESSMENT — TONOMETRY
OS_IOP_MMHG: 12
OS_IOP_MMHG: 14

## 2023-10-10 ASSESSMENT — VISUAL ACUITY
OD_BCVA: 20/20-1
OS_BCVA: 20/30

## 2023-10-31 ENCOUNTER — OFFICE (OUTPATIENT)
Dept: URBAN - METROPOLITAN AREA CLINIC 113 | Facility: CLINIC | Age: 83
Setting detail: OPHTHALMOLOGY
End: 2023-10-31
Payer: MEDICARE

## 2023-10-31 DIAGNOSIS — Z96.1: ICD-10-CM

## 2023-10-31 PROCEDURE — 99024 POSTOP FOLLOW-UP VISIT: CPT | Performed by: STUDENT IN AN ORGANIZED HEALTH CARE EDUCATION/TRAINING PROGRAM

## 2023-10-31 ASSESSMENT — REFRACTION_CURRENTRX
OS_SPHERE: +4.75
OD_AXIS: 180
OD_SPHERE: +3.75
OS_AXIS: 180
OS_OVR_VA: 20/
OS_CYLINDER: -1.00
OS_ADD: +0.00
OD_VPRISM_DIRECTION: SV
OS_AXIS: 090
OD_SPHERE: +5.75
OD_VPRISM_DIRECTION: SV
OD_OVR_VA: 20/
OS_CYLINDER: 0.00
OD_CYLINDER: -1.00
OD_ADD: +0.00
OD_AXIS: 084
OS_OVR_VA: 20/
OD_ADD: +0.00
OS_SPHERE: +4.00
OS_CYLINDER: -0.50
OD_SPHERE: +4.25
OS_ADD: +0.00
OS_SPHERE: +5.50
OS_VPRISM_DIRECTION: SV
OD_CYLINDER: 0.00
OS_OVR_VA: 20/
OS_AXIS: 094
OD_CYLINDER: -0.50
OD_AXIS: 071
OD_OVR_VA: 20/
OD_OVR_VA: 20/
OS_VPRISM_DIRECTION: SV

## 2023-10-31 ASSESSMENT — REFRACTION_MANIFEST
OD_VA1: 20/40-
OD_CYLINDER: -1.00
OD_AXIS: 080
OS_VA1: 20/30
OS_CYLINDER: -0.50
OS_SPHERE: +4.00
OD_VA1: 20/NI
OD_SPHERE: +4.50
OS_AXIS: 030
OS_AXIS: 081
OS_VA1: 20/NI
OS_CYLINDER: -1.25
OD_SPHERE: +4.75
OD_AXIS: 091
OD_CYLINDER: -3.00
OS_SPHERE: +4.00

## 2023-10-31 ASSESSMENT — PACHYMETRY
OS_CT_UM: 586
OD_CT_UM: 575
OS_CT_CORRECTION: -3
OD_CT_CORRECTION: -2

## 2023-10-31 ASSESSMENT — REFRACTION_AUTOREFRACTION
OS_SPHERE: +0.50
OD_AXIS: 091
OD_CYLINDER: -1.00
OS_AXIS: 100
OS_CYLINDER: -0.75
OD_SPHERE: +0.25

## 2023-10-31 ASSESSMENT — SPHEQUIV_DERIVED
OS_SPHEQUIV: 3.75
OD_SPHEQUIV: -0.25
OD_SPHEQUIV: 3.25
OD_SPHEQUIV: 4
OS_SPHEQUIV: 3.375
OS_SPHEQUIV: 0.125

## 2023-10-31 ASSESSMENT — TONOMETRY
OS_IOP_MMHG: 11
OD_IOP_MMHG: 10

## 2023-10-31 ASSESSMENT — VISUAL ACUITY
OD_BCVA: 20/20
OS_BCVA: 20/20-1

## 2023-10-31 ASSESSMENT — CORNEAL EDEMA - FOLDS/STRIAE: OD_FOLDSSTRIAE: ABSENT

## 2023-10-31 ASSESSMENT — CONFRONTATIONAL VISUAL FIELD TEST (CVF)
OS_FINDINGS: FULL
OD_FINDINGS: FULL

## 2023-10-31 ASSESSMENT — CORNEAL EDEMA CLINICAL DESCRIPTION: OD_CORNEALEDEMA: ABSENT

## 2024-02-15 ENCOUNTER — OFFICE (OUTPATIENT)
Dept: URBAN - METROPOLITAN AREA CLINIC 105 | Facility: CLINIC | Age: 84
Setting detail: OPHTHALMOLOGY
End: 2024-02-15
Payer: MEDICARE

## 2024-02-15 DIAGNOSIS — H40.2232: ICD-10-CM

## 2024-02-15 DIAGNOSIS — H35.033: ICD-10-CM

## 2024-02-15 DIAGNOSIS — H40.033: ICD-10-CM

## 2024-02-15 DIAGNOSIS — H35.363: ICD-10-CM

## 2024-02-15 DIAGNOSIS — E11.9: ICD-10-CM

## 2024-02-15 PROCEDURE — 92014 COMPRE OPH EXAM EST PT 1/>: CPT | Performed by: STUDENT IN AN ORGANIZED HEALTH CARE EDUCATION/TRAINING PROGRAM

## 2024-02-15 ASSESSMENT — REFRACTION_MANIFEST
OD_VA1: 20/NI
OD_CYLINDER: -3.00
OD_AXIS: 091
OD_SPHERE: +4.50
OS_SPHERE: +4.00
OS_SPHERE: +4.00
OS_AXIS: 030
OD_VA1: 20/40-
OS_CYLINDER: -0.50
OD_AXIS: 080
OD_CYLINDER: -1.00
OS_VA1: 20/30
OD_SPHERE: +4.75
OS_CYLINDER: -1.25
OS_VA1: 20/NI
OS_AXIS: 081

## 2024-02-15 ASSESSMENT — REFRACTION_CURRENTRX
OD_OVR_VA: 20/
OD_VPRISM_DIRECTION: SV
OS_AXIS: 180
OD_ADD: +0.00
OD_AXIS: 180
OS_OVR_VA: 20/
OS_CYLINDER: -0.50
OS_SPHERE: +4.00
OD_CYLINDER: -1.00
OD_VPRISM_DIRECTION: SV
OS_ADD: +0.00
OS_SPHERE: +4.75
OD_CYLINDER: 0.00
OD_CYLINDER: -0.50
OD_OVR_VA: 20/
OS_ADD: +0.00
OD_ADD: +0.00
OS_VPRISM_DIRECTION: SV
OS_AXIS: 094
OD_SPHERE: +5.75
OD_SPHERE: +4.25
OS_SPHERE: +5.50
OD_AXIS: 071
OS_OVR_VA: 20/
OS_CYLINDER: 0.00
OS_VPRISM_DIRECTION: SV
OD_OVR_VA: 20/
OS_CYLINDER: -1.00
OD_AXIS: 084
OS_AXIS: 090
OD_SPHERE: +3.75
OS_OVR_VA: 20/

## 2024-02-15 ASSESSMENT — REFRACTION_AUTOREFRACTION
OD_SPHERE: -0.25
OS_CYLINDER: -0.50
OS_AXIS: 089
OS_SPHERE: +0.25
OD_AXIS: 096
OD_CYLINDER: -0.75

## 2024-02-15 ASSESSMENT — SPHEQUIV_DERIVED
OS_SPHEQUIV: 0
OS_SPHEQUIV: 3.375
OD_SPHEQUIV: -0.625
OS_SPHEQUIV: 3.75
OD_SPHEQUIV: 3.25
OD_SPHEQUIV: 4

## 2024-02-15 ASSESSMENT — CORNEAL EDEMA - FOLDS/STRIAE: OD_FOLDSSTRIAE: ABSENT

## 2024-02-15 ASSESSMENT — CORNEAL EDEMA CLINICAL DESCRIPTION: OD_CORNEALEDEMA: ABSENT

## 2024-02-15 ASSESSMENT — CONFRONTATIONAL VISUAL FIELD TEST (CVF)
OD_FINDINGS: FULL
OS_FINDINGS: FULL

## 2024-07-30 NOTE — ED PROVIDER NOTE - CPE EDP GASTRO NORM
Time-based billing (NON-critical care) Time-based billing (NON-critical care) Time-based billing (NON-critical care) normal...

## 2024-08-28 ENCOUNTER — OFFICE (OUTPATIENT)
Dept: URBAN - METROPOLITAN AREA CLINIC 105 | Facility: CLINIC | Age: 84
Setting detail: OPHTHALMOLOGY
End: 2024-08-28
Payer: MEDICARE

## 2024-08-28 DIAGNOSIS — H40.2232: ICD-10-CM

## 2024-08-28 PROCEDURE — 99213 OFFICE O/P EST LOW 20 MIN: CPT | Performed by: STUDENT IN AN ORGANIZED HEALTH CARE EDUCATION/TRAINING PROGRAM

## 2024-08-28 PROCEDURE — 92133 CPTRZD OPH DX IMG PST SGM ON: CPT | Performed by: STUDENT IN AN ORGANIZED HEALTH CARE EDUCATION/TRAINING PROGRAM

## 2024-08-28 PROCEDURE — 92083 EXTENDED VISUAL FIELD XM: CPT | Performed by: STUDENT IN AN ORGANIZED HEALTH CARE EDUCATION/TRAINING PROGRAM

## 2024-08-28 ASSESSMENT — CONFRONTATIONAL VISUAL FIELD TEST (CVF)
OS_FINDINGS: FULL
OD_FINDINGS: FULL

## 2025-03-05 ENCOUNTER — OFFICE (OUTPATIENT)
Dept: URBAN - METROPOLITAN AREA CLINIC 105 | Facility: CLINIC | Age: 85
Setting detail: OPHTHALMOLOGY
End: 2025-03-05
Payer: MEDICARE

## 2025-03-05 DIAGNOSIS — H35.363: ICD-10-CM

## 2025-03-05 DIAGNOSIS — E11.9: ICD-10-CM

## 2025-03-05 DIAGNOSIS — H40.2232: ICD-10-CM

## 2025-03-05 DIAGNOSIS — H35.033: ICD-10-CM

## 2025-03-05 DIAGNOSIS — H40.033: ICD-10-CM

## 2025-03-05 PROCEDURE — 92014 COMPRE OPH EXAM EST PT 1/>: CPT | Performed by: STUDENT IN AN ORGANIZED HEALTH CARE EDUCATION/TRAINING PROGRAM

## 2025-03-05 PROCEDURE — 92250 FUNDUS PHOTOGRAPHY W/I&R: CPT | Performed by: STUDENT IN AN ORGANIZED HEALTH CARE EDUCATION/TRAINING PROGRAM

## 2025-03-05 ASSESSMENT — REFRACTION_MANIFEST
OD_AXIS: 091
OS_VA1: 20/NI
OS_CYLINDER: -0.50
OD_VA1: 20/40-
OD_CYLINDER: -1.00
OS_AXIS: 030
OD_SPHERE: +4.50
OS_SPHERE: +4.00
OD_AXIS: 080
OS_VA1: 20/30
OS_CYLINDER: -1.25
OS_SPHERE: +4.00
OD_SPHERE: +4.75
OD_CYLINDER: -3.00
OD_VA1: 20/NI
OS_AXIS: 081

## 2025-03-05 ASSESSMENT — KERATOMETRY
OS_K1POWER_DIOPTERS: 41.25
OD_K2POWER_DIOPTERS: 42.25
OD_AXISANGLE_DEGREES: 6
OS_K2POWER_DIOPTERS: 42.25
OS_AXISANGLE_DEGREES: 9
OD_K1POWER_DIOPTERS: 41.25

## 2025-03-05 ASSESSMENT — REFRACTION_CURRENTRX
OD_AXIS: 180
OS_CYLINDER: -1.00
OD_SPHERE: +4.25
OD_AXIS: 071
OS_AXIS: 180
OD_SPHERE: +3.75
OD_VPRISM_DIRECTION: SV
OD_OVR_VA: 20/
OS_OVR_VA: 20/
OD_CYLINDER: -1.00
OS_OVR_VA: 20/
OS_ADD: +0.00
OS_AXIS: 094
OS_CYLINDER: -0.50
OS_CYLINDER: 0.00
OD_CYLINDER: -0.50
OS_SPHERE: +4.75
OD_VPRISM_DIRECTION: SV
OS_ADD: +0.00
OD_CYLINDER: 0.00
OS_SPHERE: +4.00
OS_VPRISM_DIRECTION: SV
OS_SPHERE: +5.50
OS_VPRISM_DIRECTION: SV
OD_ADD: +0.00
OS_OVR_VA: 20/
OD_SPHERE: +5.75
OS_AXIS: 090
OD_ADD: +0.00
OD_OVR_VA: 20/
OD_OVR_VA: 20/
OD_AXIS: 084

## 2025-03-05 ASSESSMENT — VISUAL ACUITY
OD_BCVA: 20/20-1
OS_BCVA: 20/25-1

## 2025-03-05 ASSESSMENT — PACHYMETRY
OD_CT_UM: 575
OS_CT_UM: 586
OD_CT_CORRECTION: -2
OS_CT_CORRECTION: -3

## 2025-03-05 ASSESSMENT — CONFRONTATIONAL VISUAL FIELD TEST (CVF)
OD_FINDINGS: FULL
OS_FINDINGS: FULL

## 2025-03-05 ASSESSMENT — REFRACTION_AUTOREFRACTION
OS_SPHERE: +0.75
OD_CYLINDER: -1.00
OD_AXIS: 82
OS_CYLINDER: -1.00
OS_AXIS: 94
OD_SPHERE: PLANO

## 2025-03-05 ASSESSMENT — TONOMETRY
OD_IOP_MMHG: 15
OS_IOP_MMHG: 15

## 2025-08-06 ENCOUNTER — OFFICE (OUTPATIENT)
Dept: URBAN - METROPOLITAN AREA CLINIC 105 | Facility: CLINIC | Age: 85
Setting detail: OPHTHALMOLOGY
End: 2025-08-06
Payer: MEDICARE

## 2025-08-06 DIAGNOSIS — H40.2232: ICD-10-CM

## 2025-08-06 PROCEDURE — 99213 OFFICE O/P EST LOW 20 MIN: CPT | Performed by: STUDENT IN AN ORGANIZED HEALTH CARE EDUCATION/TRAINING PROGRAM

## 2025-08-06 PROCEDURE — 92083 EXTENDED VISUAL FIELD XM: CPT | Performed by: STUDENT IN AN ORGANIZED HEALTH CARE EDUCATION/TRAINING PROGRAM

## 2025-08-06 PROCEDURE — 92133 CPTRZD OPH DX IMG PST SGM ON: CPT | Performed by: STUDENT IN AN ORGANIZED HEALTH CARE EDUCATION/TRAINING PROGRAM

## 2025-08-06 ASSESSMENT — REFRACTION_CURRENTRX
OS_SPHERE: +4.75
OS_OVR_VA: 20/
OD_SPHERE: +3.75
OS_CYLINDER: 0.00
OS_VPRISM_DIRECTION: SV
OD_SPHERE: +5.75
OD_OVR_VA: 20/
OS_AXIS: 090
OS_VPRISM_DIRECTION: SV
OD_CYLINDER: -0.50
OD_VPRISM_DIRECTION: SV
OS_ADD: +0.00
OS_SPHERE: +4.00
OD_AXIS: 071
OS_AXIS: 094
OD_AXIS: 180
OD_VPRISM_DIRECTION: SV
OS_CYLINDER: -1.00
OD_CYLINDER: 0.00
OD_AXIS: 084
OS_OVR_VA: 20/
OD_CYLINDER: -1.00
OD_OVR_VA: 20/
OS_SPHERE: +5.50
OD_OVR_VA: 20/
OS_OVR_VA: 20/
OS_ADD: +0.00
OD_ADD: +0.00
OS_CYLINDER: -0.50
OD_SPHERE: +4.25
OS_AXIS: 180
OD_ADD: +0.00

## 2025-08-06 ASSESSMENT — KERATOMETRY
OD_K2POWER_DIOPTERS: 42.25
OD_K1POWER_DIOPTERS: 41.25
OS_K1POWER_DIOPTERS: 41.25
OS_AXISANGLE_DEGREES: 008
OD_AXISANGLE_DEGREES: 003
OS_K2POWER_DIOPTERS: 42.25

## 2025-08-06 ASSESSMENT — TONOMETRY
OD_IOP_MMHG: 14
OS_IOP_MMHG: 13

## 2025-08-06 ASSESSMENT — REFRACTION_MANIFEST
OS_SPHERE: +4.00
OD_AXIS: 091
OD_VA1: 20/NI
OD_VA1: 20/40-
OD_AXIS: 080
OS_AXIS: 030
OD_CYLINDER: -3.00
OS_CYLINDER: -0.50
OS_CYLINDER: -1.25
OD_CYLINDER: -1.00
OS_SPHERE: +4.00
OD_SPHERE: +4.75
OS_VA1: 20/NI
OS_AXIS: 081
OD_SPHERE: +4.50
OS_VA1: 20/30

## 2025-08-06 ASSESSMENT — PACHYMETRY
OD_CT_UM: 575
OD_CT_CORRECTION: -2
OS_CT_CORRECTION: -3
OS_CT_UM: 586

## 2025-08-06 ASSESSMENT — REFRACTION_AUTOREFRACTION
OS_CYLINDER: -1.00
OS_AXIS: 082
OD_CYLINDER: -1.25
OD_AXIS: 089
OS_SPHERE: +1.00
OD_SPHERE: PLANO

## 2025-08-06 ASSESSMENT — VISUAL ACUITY
OD_BCVA: 20/20-2
OS_BCVA: 20/20-2

## 2025-08-06 ASSESSMENT — CONFRONTATIONAL VISUAL FIELD TEST (CVF)
OD_FINDINGS: FULL
OS_FINDINGS: FULL